# Patient Record
Sex: MALE | Race: WHITE | Employment: FULL TIME | URBAN - METROPOLITAN AREA
[De-identification: names, ages, dates, MRNs, and addresses within clinical notes are randomized per-mention and may not be internally consistent; named-entity substitution may affect disease eponyms.]

---

## 2020-11-30 ENCOUNTER — OFFICE VISIT (OUTPATIENT)
Dept: ENT CLINIC | Age: 50
End: 2020-11-30
Payer: COMMERCIAL

## 2020-11-30 VITALS — DIASTOLIC BLOOD PRESSURE: 88 MMHG | SYSTOLIC BLOOD PRESSURE: 142 MMHG | HEART RATE: 66 BPM | TEMPERATURE: 97.8 F

## 2020-11-30 PROCEDURE — 1036F TOBACCO NON-USER: CPT | Performed by: OTOLARYNGOLOGY

## 2020-11-30 PROCEDURE — G8427 DOCREV CUR MEDS BY ELIG CLIN: HCPCS | Performed by: OTOLARYNGOLOGY

## 2020-11-30 PROCEDURE — 3017F COLORECTAL CA SCREEN DOC REV: CPT | Performed by: OTOLARYNGOLOGY

## 2020-11-30 PROCEDURE — 99203 OFFICE O/P NEW LOW 30 MIN: CPT | Performed by: OTOLARYNGOLOGY

## 2020-11-30 PROCEDURE — G8484 FLU IMMUNIZE NO ADMIN: HCPCS | Performed by: OTOLARYNGOLOGY

## 2020-11-30 PROCEDURE — G8421 BMI NOT CALCULATED: HCPCS | Performed by: OTOLARYNGOLOGY

## 2020-11-30 RX ORDER — LISINOPRIL 10 MG/1
TABLET ORAL
COMMUNITY

## 2020-11-30 RX ORDER — FLUTICASONE PROPIONATE 50 MCG
SPRAY, SUSPENSION (ML) NASAL
COMMUNITY

## 2020-11-30 RX ORDER — PANTOPRAZOLE SODIUM 40 MG/1
TABLET, DELAYED RELEASE ORAL
COMMUNITY

## 2020-11-30 RX ORDER — MULTIVITAMIN
1 TABLET ORAL DAILY
COMMUNITY
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
## 2020-12-01 ENCOUNTER — PROCEDURE VISIT (OUTPATIENT)
Dept: AUDIOLOGY | Age: 50
End: 2020-12-01
Payer: COMMERCIAL

## 2020-12-01 PROCEDURE — 92557 COMPREHENSIVE HEARING TEST: CPT | Performed by: AUDIOLOGIST

## 2020-12-01 PROCEDURE — 92550 TYMPANOMETRY & REFLEX THRESH: CPT | Performed by: AUDIOLOGIST

## 2020-12-01 NOTE — PROGRESS NOTES
[facility]  Division of Audiology/Otolaryngology        PCP:  Renetta Esquivel DO  MRN: [id number]     CHIEF COMPLAINT:  Tinnitus        HISTORY OF PRESENT ILLNESS  Jose Leiva was seen for hearing and middle ear evaluation. Otolaryngology is referral source of today's appointment. Patient presents with tinnitus. There are minimal hearing concerns. AUDIOGRAM & IMMITTANCE    Hearing status is mild from both ears. The loss is of sensory nature. Loss is symmetrical. Good speech discernment demonstrated in quiet, at elevated levels. Immittance testing is consistent with normal middle ear status (Type A tympanogram). Some ipsilateral acoustic reflexes present. RECOMMENDATIONS / PLAN:    Follow medical recommendations. ENT to advise.

## 2021-07-22 ENCOUNTER — TELEPHONE (OUTPATIENT)
Dept: ENDOCRINOLOGY | Age: 51
End: 2021-07-22

## 2021-07-22 NOTE — TELEPHONE ENCOUNTER
Fax from [facility] w / a referral to endo  Diagnosis-decreased testosterone  Referring - Dr Kaela Flores

## 2021-12-01 ENCOUNTER — OFFICE VISIT (OUTPATIENT)
Dept: ENDOCRINOLOGY | Age: 51
End: 2021-12-01
Payer: COMMERCIAL

## 2021-12-01 VITALS — DIASTOLIC BLOOD PRESSURE: 87 MMHG | WEIGHT: 315 LBS | HEART RATE: 89 BPM | SYSTOLIC BLOOD PRESSURE: 141 MMHG

## 2021-12-01 DIAGNOSIS — R79.89 DECREASED TESTOSTERONE LEVEL: Primary | ICD-10-CM

## 2021-12-01 DIAGNOSIS — R73.03 PREDIABETES: ICD-10-CM

## 2021-12-01 DIAGNOSIS — E78.2 MIXED HYPERLIPIDEMIA: ICD-10-CM

## 2021-12-01 DIAGNOSIS — I10 ESSENTIAL HYPERTENSION: ICD-10-CM

## 2021-12-01 DIAGNOSIS — N52.8 OTHER MALE ERECTILE DYSFUNCTION: ICD-10-CM

## 2021-12-01 PROBLEM — K21.9 GASTROESOPHAGEAL REFLUX DISEASE WITHOUT ESOPHAGITIS: Status: ACTIVE | Noted: 2020-10-26

## 2021-12-01 PROCEDURE — G8484 FLU IMMUNIZE NO ADMIN: HCPCS | Performed by: INTERNAL MEDICINE

## 2021-12-01 PROCEDURE — G8421 BMI NOT CALCULATED: HCPCS | Performed by: INTERNAL MEDICINE

## 2021-12-01 PROCEDURE — G8427 DOCREV CUR MEDS BY ELIG CLIN: HCPCS | Performed by: INTERNAL MEDICINE

## 2021-12-01 PROCEDURE — 99203 OFFICE O/P NEW LOW 30 MIN: CPT | Performed by: INTERNAL MEDICINE

## 2021-12-01 RX ORDER — ALBUTEROL SULFATE 90 UG/1
AEROSOL, METERED RESPIRATORY (INHALATION)
COMMUNITY

## 2021-12-01 RX ORDER — SILDENAFIL 100 MG/1
TABLET, FILM COATED ORAL
COMMUNITY

## 2021-12-01 NOTE — PROGRESS NOTES
Patient  Is referred here for evaluation and management of low Testosterone levels. He has noted weight gain in the last 2 years also c/o erectile dysfunction and has tried Viagra with improved results. He was noted to have low testosterone on his annual screening according to the patient. He has noticed some decrease in physical strength. Retrospectively he thinks he has unexplained weight gain decrease in strength  He had no trauma to his head or head injury. He has no visual field defects. He has no anosmia. No history of pituitary disease. No breast discharge. No trauma to his testes. No history of mumps. No history of sleep apnea. He has no problems with fertility in the past and has children. He has no prominence of breast tissue. He has normal pubertal development. He has no history of microphallus. He has not noticed any changes in body hair distribution or decreased shaving frequency. He has noticed decrease in libido. He has erectile dysfunction with difficulty initiating and maintaining erection He has not noted muscular weakness in upper and lower extremities. He has no history of prostate problems. He has no problems with ejaculation. He has no history of any androgen use for performance enhancement or other purposes. On review of blood work from June 2021 patient had a PSA of 0.4, free testosterone of 32 which was slightly below the lower limit of normal of 35, sex hormone binding globulin was 27 which was within normal limits, he is noted to have an A1c of 6.0, TSH 0.9 hemoglobin hematocrit 14.6/44. total testosterone 137 ( 254 ) collected at 4 pm aprox.  He wakes up at 3 am.  Patient has history of essential hypertension for which she is on lisinopril  On review of records he appears to have prediabetes with an A1c of 6.0 in June 2021  He also has longstanding history of erectile dysfunction for which he has tried Viagra in the past.      Review of Systems:    I have reviewed the review of system questionnaire filled by the patient . Patient was advised to contact PCP for non endocrine signs and symptoms     History reviewed. No pertinent past medical history. History reviewed. No pertinent surgical history. Social History     Socioeconomic History    Marital status:      Spouse name: Not on file    Number of children: Not on file    Years of education: Not on file    Highest education level: Not on file   Occupational History    Not on file   Tobacco Use    Smoking status: Never Smoker    Smokeless tobacco: Never Used   Substance and Sexual Activity    Alcohol use: Yes    Drug use: Never    Sexual activity: Not on file   Other Topics Concern    Not on file   Social History Narrative    Not on file     Social Determinants of Health     Financial Resource Strain:     Difficulty of Paying Living Expenses: Not on file   Food Insecurity:     Worried About Running Out of Food in the Last Year: Not on file    Ruben of Food in the Last Year: Not on file   Transportation Needs:     Lack of Transportation (Medical): Not on file    Lack of Transportation (Non-Medical):  Not on file   Physical Activity:     Days of Exercise per Week: Not on file    Minutes of Exercise per Session: Not on file   Stress:     Feeling of Stress : Not on file   Social Connections:     Frequency of Communication with Friends and Family: Not on file    Frequency of Social Gatherings with Friends and Family: Not on file    Attends Yazidi Services: Not on file    Active Member of Clubs or Organizations: Not on file    Attends Club or Organization Meetings: Not on file    Marital Status: Not on file   Intimate Partner Violence:     Fear of Current or Ex-Partner: Not on file    Emotionally Abused: Not on file    Physically Abused: Not on file    Sexually Abused: Not on file   Housing Stability:     Unable to Pay for Housing in the Last Year: Not on file    Number of [place] in the Last Year: Not on file    Unstable Housing in the Last Year: Not on file     History reviewed. No pertinent family history. Prior to Admission medications    Medication Sig Start Date End Date Taking? Authorizing Provider   albuterol sulfate HFA (VENTOLIN HFA) 108 (90 Base) MCG/ACT inhaler Ventolin HFA 90 mcg/actuation aerosol inhaler   Inhale 2 puffs every 4 hours by inhalation route as needed. Yes Historical Provider, MD   sildenafil (VIAGRA) 100 MG tablet sildenafil 100 mg tablet   TAKE 1 TABLET BY MOUTH 1 HOUR PRIOR TO SEXUAL ACTIVITY, AS NEEDED UP TO ONCE DAILY   Yes Historical Provider, MD   lisinopril (PRINIVIL;ZESTRIL) 10 MG tablet lisinopril 10 mg tablet   Take 1 tablet every day by oral route. Yes Historical Provider, MD   pantoprazole (PROTONIX) 40 MG tablet pantoprazole 40 mg tablet,delayed release   Take 1 tablet every day by oral route. Yes Historical Provider, MD   Multiple Vitamin (MULTIVITAMIN) tablet Take 1 tablet by mouth daily   Yes Historical Provider, MD   fluticasone (FLONASE) 50 MCG/ACT nasal spray fluticasone propionate 50 mcg/actuation nasal spray,suspension   1 spray everyday to each nostril   Yes Historical Provider, MD     Allergies   Allergen Reactions    Latex Rash     Causes blisters      Seasonal         OBJECTIVE:  BP (!) 141/87   Pulse 89   Wt (!) 315 lb 9.6 oz (143.2 kg)      Constitutional: no acute distress, well appearing and well nourished  Psychiatric: oriented to person, place and time, judgement and insight and normal, recent and remote memory and intact and mood and affect are normal  Skin: skin and subcutaneous tissue is normal without mass, normal turgor  Head and Face: examination of head and face revealed no abnormalities  Eyes: no lid or conjunctival swelling, erythema or discharge, pupils are normal, equal and round .   Ears/Nose: external inspection of ears and nose revealed no abnormalities, hearing is grossly normal  Oropharynx/Mouth/Face: lips, tongue and gums are normal with no lesions, the voice quality was normal  Neck: neck is supple and symmetric, with midline trachea and no masses, thyroid is normal  Lymphatics: normal cervical lymph nodes, normal supraclavicular nodes  Pulmonary: no increased work of breathing or signs of respiratory distress, lungs are clear to auscultation  Cardiovascular: normal heart rate and rhythm, normal S1 and S2, no murmurs   Abdomen: abdomen is soft, non-tender with no masses  Musculoskeletal: normal gait and station . Neurological: normal coordination and normal general cortical function    Assessment/Plan:    1. Decreased testosterone level  Patient is noted to have a decreased testosterone level of 137 on his blood work in June 2021 which was drawn at approximately at 4 PM.   He had a PSA of 0.4 drawn at the same time. Hypogonadism appears to be secondary in etiology. He has obesity and metabolic syndrome and hypogonadism might be related to these. He probably has sleep apnea as well but has not been diagnosed with it he was advised to look into this diagnosis. He does not think he has that    At this time I will do hormonal pituitary evaluation as well as iron studies. I will complete the pituitary evaluation and if need be ,order an MRI of pituitary to rule out pituitary abnormality. Patient does not have any signs or symptoms suggestive of Klinefelter syndrome, if blood work dictates will further investigate this as an etiology. He was told that Testosterone replacement therapy can result in suppression of endogenous Testosterone production and decrease the chances of fertility. He was made aware of side effects of Testosterone therapy including gynecomastia, painful breasts, worsening prostate problems, increased hematocrit, and possible adverse effects on sleep apnea and lipid profile. Patient verbalized understanding and consented for treatment. - Estradiol; Future  - Follicle Stimulating Hormone;  Future  - Testosterone Free and Total Male; Future  - TSH with Reflex; Future  - FREE T4; Future  - Prolactin; Future  - Luteinizing Hormone; Future  - Comprehensive Metabolic Panel; Future    2. Other male erectile dysfunction  On Viagra     3. Essential hypertension  BP control adequate at home     4. Mixed hyperlipidemia  elevated levels   Follows with PCP     5. Prediabetes  Aic 6 in June 2021   Follows with PCP     Return in about 3 months (around 3/1/2022).

## 2021-12-01 NOTE — PATIENT INSTRUCTIONS
Patient Education        testosterone injection  Pronunciation:  vasiliy TOS ter one  Brand: Aveed, Depo-Testosterone, Testosterone Cypionate, Testosterone Enanthate, Xyosted  What is the most important information I should know about testosterone injection? You should not be treated with testosterone if you have prostate cancer, male breast cancer, a serious heart condition, severe liver or kidney disease, or an allergy to castor oil or sesame oil. This medicine is not for use in treating low testosterone without certain medical conditions or due to getting older. Testosterone should not be used to enhance athletic performance. Testosterone injection is not for use in women who are pregnant. Testosterone can increase your risk of heart attack, stroke, or death. You may need to stop using testosterone or start taking blood pressure medication. Misuse of testosterone can cause dangerous or irreversible effects. Do not share this medicine with another person. What is testosterone injection? Testosterone is a naturally occurring sex hormone produced in a man's testicles. Small amounts of testosterone are also produced in a woman's ovaries and adrenal system. Testosterone injection is used in men and boys to treat conditions caused by a lack of this hormone, such as delayed puberty, impotence, or other hormonal imbalances. Testosterone injection is not for use  in treating low testosterone without certain medical conditions or due to getting older. Testosterone enanthate is used in women to treat breast cancer that has spread to other parts of the body (metastatic) and cannot be treated with surgery. Testosterone will not enhance athletic performance and should not be used for that purpose. Testosterone injection may also be used for purposes not listed in this medication guide. What should I discuss with my healthcare provider before receiving testosterone injection?   You should not be treated with this medicine if you are allergic to testosterone, or if you have:  · male breast cancer;  · prostate cancer;  · serious heart problems;  · severe liver disease;  · severe kidney disease; or  · an allergy to castor oil or sesame oil. Testosterone injection is not for use in women who are pregnant. This medicine can harm an unborn baby. Tell your doctor if you have ever had:  · high blood pressure;  · heart problems, coronary artery disease (clogged arteries);  · a heart attack or stroke;  · sleep apnea;  · an enlarged prostate and urination problems;  · high cholesterol or triglycerides;  · cancer;  · depression, anxiety, a mood disorder, suicidal thoughts or actions;  · diabetes;  · high red blood cell (RBC) counts; or  · liver or kidney disease. Using testosterone may increase your risk of developing prostate cancer, liver problems, or heart problems (including heart attack, stroke, or death). Ask your doctor about these risks. Women using testosterone should not breastfeed. Testosterone should not be given to a child younger than 15years old. Some types of this medicine are not approved for use by anyone younger than 25years old. How is testosterone injection given? Testosterone is injected under the skin or into a muscle, usually given every 2 to 4 weeks. Testosterone injections should be given only by a healthcare professional.  The length of treatment with testosterone injection will depend on the condition being treated. Testosterone can raise your blood pressure, which could increase your risk of heart attack, stroke, or death. Your blood pressure will need to be checked often. You may need to stop using testosterone or start taking blood pressure medication. You will need frequent blood tests. Testosterone can affect bone growth in boys who are treated for delayed puberty. Bone development may need to be checked with x-rays every 6 months during treatment.   This medicine can affect the results of certain medical tests. Tell any doctor who treats you that you are using testosterone. Misuse of testosterone can cause dangerous or irreversible effects, such as enlarged breasts, small testicles, infertility, high blood pressure, heart attack, stroke, liver disease, bone growth problems, addiction, and mental effects such as aggression and violence. Stealing, selling, or giving away this medicine is against the law. If you have used too much testosterone, stopping the medicine may caused unpleasant withdrawal symptoms, such as depression, tiredness, irritability, loss of appetite, sleep problems, or decreased libido. What happens if I miss a dose? Call your doctor for instructions if you miss an appointment for your testosterone injection. What happens if I overdose? Since this medicine is given by a healthcare professional in a medical setting, an overdose is unlikely to occur. What should I avoid while receiving testosterone injection? Follow your doctor's instructions about any restrictions on food, beverages, or activity. What are the possible side effects of testosterone injection? Get emergency medical help if you have signs of an allergic reaction:  hives; difficult breathing; swelling of your face, lips, tongue, or throat. Tell your caregivers right away if you have a tight feeling in your throat, a sudden urge to cough, or if you feel light-headed or short of breath during or shortly after receiving the injection. You will be watched closely for at least 30 minutes to make sure you do not have a reaction to the injection.   Call your doctor at once if you have:  · chest pain or pressure, pain spreading to your jaw or shoulder;  · shortness of breath, breathing problems at night (sleep apnea);  · swelling in your ankles or feet, rapid weight gain;  · a seizure;  · unusual changes in mood or behavior;  · increased or ongoing erection of the penis, ejaculation problems, decreased amounts of semen, decrease in testicle size;  · painful or difficult urination, increased urination at night, loss of bladder control;  · high levels of calcium in the blood --stomach pain, constipation, increased thirst or urination, muscle pain or weakness, joint pain, confusion, and feeling tired or restless; or  · high potassium level --nausea, weakness, tingly feeling, chest pain, irregular heartbeats, loss of movement;  · liver problems --right-sided upper stomach pain, vomiting, loss of appetite, dark urine, jaundice (yellowing of the skin or eyes). · signs of a blood clot deep in the body --swelling, warmth, or redness in an arm or leg;  · signs of a blood clot in the lung --chest pain, sudden cough, wheezing, rapid breathing, coughing up blood; or  · signs of a stroke --sudden numbness or weakness (especially on one side of the body), severe headache, slurred speech, balance problems. Women receiving testosterone may develop male characteristics, which could be irreversible if treatment is continued. Call your doctor at once if you notice any of these signs of excess testosterone:  · acne;  · changes in your menstrual periods (including missed periods);  · male-pattern hair growth (such as on the chin or chest);  · hoarse or deepened voice; or  · enlarged clitoris. Your testosterone injections may be delayed or permanently discontinued if you have certain side effects. Common side effects (in men or women) may include:  · breast swelling;  · acne, increased facial or body hair growth, male-pattern baldness;  · increased or decreased interest in sex;  · headache, anxiety, depressed mood;  · increased blood pressure;  · numbness or tingly feeling;  · abnormal liver function tests;  · high red blood cell counts (hematocrit or hemoglobin);  · increased PSA (prostate-specific antigen); or  · pain, bruising, bleeding, redness, or a hard lump where the medicine was injected.   This is not a complete list of side effects and others may occur. Call your doctor for medical advice about side effects. You may report side effects to FDA at 4-369-AVN-3313. What other drugs will affect testosterone injection? Tell your doctor about all your other medicines, especially:  · insulin or oral diabetes medicine;  · medicine to treat pain, cough, or cold symptoms;  · a blood thinner --warfarin, Coumadin, Jantoven; or  · steroid medicine --prednisone, dexamethasone, and others. This list is not complete. Other drugs may affect testosterone, including prescription and over-the-counter medicines, vitamins, and herbal products. Not all possible drug interactions are listed here. Where can I get more information? Your doctor or pharmacist can provide more information about testosterone injection. Remember, keep this and all other medicines out of the reach of children, never share your medicines with others, and use this medication only for the indication prescribed. Every effort has been made to ensure that the information provided by [address] is accurate, up-to-date, and complete, but no guarantee is made to that effect. Drug information contained herein may be time sensitive. [employer] information has been compiled for use by healthcare practitioners and consumers in the [place] and therefore [employer] does not warrant that uses outside of the [place] are appropriate, unless specifically indicated otherwise. [employer]'s drug information does not endorse drugs, diagnose patients or recommend therapy. [employer] drug information is an informational resource designed to assist licensed healthcare practitioners in caring for their patients and/or to serve consumers viewing this service as a supplement to, and not a substitute for, the expertise, skill, knowledge and judgment of healthcare practitioners.  The absence of a warning for a given drug or drug combination in no way should be construed to indicate that the drug or drug combination is safe, effective or appropriate for any given patient. [facility] does not assume any responsibility for any aspect of healthcare administered with the aid of information [facility] provides. The information contained herein is not intended to cover all possible uses, directions, precautions, warnings, drug interactions, allergic reactions, or adverse effects. If you have questions about the drugs you are taking, check with your doctor, nurse or pharmacist.  Copyright 8294-3702 [address]: 4.01. Revision date: 7/14/2020. Care instructions adapted under license by [facility] ([facility]). If you have questions about a medical condition or this instruction, always ask your healthcare professional. [place] [address] any warranty or liability for your use of this information.

## 2021-12-01 NOTE — LETTER
[facility]  [address] [facility] [address] [address]  Phone: [phone]  Fax: [phone]    Helene James MD    December 1, 2021     [address] [address]  [address]    Patient: Karol Robledo   MR Number: [id number]   YOB: 1970   Date of Visit: 12/1/2021       Dear Edgar DOMINGO:    Thank you for referring Devin Lion to me for evaluation/treatment. Below are the relevant portions of my assessment and plan of care. If you have questions, please do not hesitate to call me. I look forward to following Cathy Ritter along with you.     Sincerely,      Helene James MD

## 2022-07-26 ENCOUNTER — HOSPITAL ENCOUNTER (OUTPATIENT)
Age: 52
Discharge: HOME | End: 2022-07-26
Payer: COMMERCIAL

## 2022-07-26 ENCOUNTER — HOSPITAL ENCOUNTER (OUTPATIENT)
Dept: GENERAL RADIOLOGY | Age: 52
Discharge: HOME | End: 2022-07-26
Payer: COMMERCIAL

## 2022-07-26 DIAGNOSIS — M25.561 RIGHT KNEE PAIN, UNSPECIFIED CHRONICITY: ICD-10-CM

## 2022-07-26 PROCEDURE — 73564 X-RAY EXAM KNEE 4 OR MORE: CPT

## 2024-05-02 ENCOUNTER — OFFICE VISIT (OUTPATIENT)
Dept: INTERNAL MEDICINE CLINIC | Age: 54
End: 2024-05-02
Payer: COMMERCIAL

## 2024-05-02 VITALS
WEIGHT: 315 LBS | HEIGHT: 74 IN | OXYGEN SATURATION: 95 % | HEART RATE: 89 BPM | BODY MASS INDEX: 40.43 KG/M2 | SYSTOLIC BLOOD PRESSURE: 132 MMHG | DIASTOLIC BLOOD PRESSURE: 76 MMHG

## 2024-05-02 DIAGNOSIS — I10 PRIMARY HYPERTENSION: ICD-10-CM

## 2024-05-02 DIAGNOSIS — J30.89 ENVIRONMENTAL AND SEASONAL ALLERGIES: ICD-10-CM

## 2024-05-02 DIAGNOSIS — Z87.828 HX OF TEAR OF MENISCUS OF KNEE JOINT: ICD-10-CM

## 2024-05-02 DIAGNOSIS — Z12.11 COLON CANCER SCREENING: ICD-10-CM

## 2024-05-02 DIAGNOSIS — R73.03 PREDIABETES: ICD-10-CM

## 2024-05-02 DIAGNOSIS — Z00.00 ANNUAL PHYSICAL EXAM: Primary | ICD-10-CM

## 2024-05-02 PROBLEM — L30.9 ECZEMA: Status: ACTIVE | Noted: 2023-09-11

## 2024-05-02 PROBLEM — K21.9 GASTROESOPHAGEAL REFLUX DISEASE WITHOUT ESOPHAGITIS: Status: RESOLVED | Noted: 2020-10-26 | Resolved: 2024-05-02

## 2024-05-02 PROBLEM — J45.990 EXERCISE-INDUCED ASTHMA: Status: ACTIVE | Noted: 2022-02-10

## 2024-05-02 PROBLEM — J45.990 EXERCISE-INDUCED ASTHMA: Status: RESOLVED | Noted: 2022-02-10 | Resolved: 2024-05-02

## 2024-05-02 PROCEDURE — 99386 PREV VISIT NEW AGE 40-64: CPT | Performed by: INTERNAL MEDICINE

## 2024-05-02 PROCEDURE — 3075F SYST BP GE 130 - 139MM HG: CPT | Performed by: INTERNAL MEDICINE

## 2024-05-02 PROCEDURE — 3078F DIAST BP <80 MM HG: CPT | Performed by: INTERNAL MEDICINE

## 2024-05-02 SDOH — ECONOMIC STABILITY: FOOD INSECURITY: WITHIN THE PAST 12 MONTHS, THE FOOD YOU BOUGHT JUST DIDN'T LAST AND YOU DIDN'T HAVE MONEY TO GET MORE.: NEVER TRUE

## 2024-05-02 SDOH — ECONOMIC STABILITY: FOOD INSECURITY: WITHIN THE PAST 12 MONTHS, YOU WORRIED THAT YOUR FOOD WOULD RUN OUT BEFORE YOU GOT MONEY TO BUY MORE.: NEVER TRUE

## 2024-05-02 SDOH — ECONOMIC STABILITY: HOUSING INSECURITY
IN THE LAST 12 MONTHS, WAS THERE A TIME WHEN YOU DID NOT HAVE A STEADY PLACE TO SLEEP OR SLEPT IN A SHELTER (INCLUDING NOW)?: NO

## 2024-05-02 SDOH — ECONOMIC STABILITY: INCOME INSECURITY: HOW HARD IS IT FOR YOU TO PAY FOR THE VERY BASICS LIKE FOOD, HOUSING, MEDICAL CARE, AND HEATING?: NOT HARD AT ALL

## 2024-05-02 ASSESSMENT — PATIENT HEALTH QUESTIONNAIRE - PHQ9
1. LITTLE INTEREST OR PLEASURE IN DOING THINGS: NOT AT ALL
SUM OF ALL RESPONSES TO PHQ QUESTIONS 1-9: 0
SUM OF ALL RESPONSES TO PHQ9 QUESTIONS 1 & 2: 0
SUM OF ALL RESPONSES TO PHQ QUESTIONS 1-9: 0
2. FEELING DOWN, DEPRESSED OR HOPELESS: NOT AT ALL

## 2024-05-02 ASSESSMENT — ENCOUNTER SYMPTOMS
CHEST TIGHTNESS: 0
COLOR CHANGE: 0
CONSTIPATION: 0
COUGH: 0
SORE THROAT: 0
ABDOMINAL PAIN: 0
WHEEZING: 0
SINUS PRESSURE: 0
NAUSEA: 0
RHINORRHEA: 0
VOMITING: 0
BACK PAIN: 0
SHORTNESS OF BREATH: 0

## 2024-05-02 NOTE — ASSESSMENT & PLAN NOTE
he does follow-up with [facility] orthopedic, he is status post meniscal repair surgery last year

## 2024-05-02 NOTE — PROGRESS NOTES
ASSESSMENT/PLAN:  1. Annual physical exam  Assessment & Plan:    age-related health maintenance and immunization recommendations reviewed and discussed with patient, he believes he is up-to-date with Tdap within the past 2 to 3 years, declined shingles vaccine  Labs ordered, healthy diet and regular exercise recommendations made to patient especially with history of metabolic syndrome including hypertension, prediabetes and hyperlipidemia.  Encouraged to continue abstinence from smoking and alcohol consumption  Recommendation for colon cancer screening discussed with patient, he had Cologuard done at age 49, encouraged to consider standard colonoscopy as it is the superior test however he prefers Cologuard.  He does understand pros and cons of using Cologuard  Denies any family or personal history of prostate cancer, denies any urinary symptoms, will check PSA baseline   Depression screen is negative  Follow-up in 6 to 12 months pending lab results  Orders:  -     CBC; Future  -     Comprehensive Metabolic Panel; Future  -     Hemoglobin A1C; Future  -     Lipid Panel; Future  -     TSH with Reflex to FT4; Future  -     PSA, Prostatic Specific Antigen; Future  -     Fecal DNA Colorectal cancer screening (Cologuard)  2. Primary hypertension  Assessment & Plan:    blood pressure stable and well-controlled on current dose of lisinopril, continue same  3. BMI 40.0-44.9, adult ([facility])  Assessment & Plan:    recommendations for weight loss made to patient, he does have limited ability to exercise or do cardio work out due to right knee flaring up following a meniscal tear 1 year ago.  Recommended adhering to low-carb diet, cut back on portion sizes and attempt nonweightbearing exercises.  4. Prediabetes  5. Colon cancer screening  -     Fecal DNA Colorectal cancer screening (Cologuard)  6. Hx of tear of meniscus of knee joint  Assessment & Plan:    he does follow-up with [facility], he is status post meniscal repair

## 2024-05-02 NOTE — ASSESSMENT & PLAN NOTE
not on any specific medications, uses over-the-counter antihistamine for sinus congestion during peak season but otherwise stable.  Encouraged to continue abstinence from smoking and avoid known triggers as possible

## 2024-05-02 NOTE — ASSESSMENT & PLAN NOTE
age-related health maintenance and immunization recommendations reviewed and discussed with patient, he believes he is up-to-date with Tdap within the past 2 to 3 years, declined shingles vaccine  Labs ordered, healthy diet and regular exercise recommendations made to patient especially with history of metabolic syndrome including hypertension, prediabetes and hyperlipidemia.  Encouraged to continue abstinence from smoking and alcohol consumption  Recommendation for colon cancer screening discussed with patient, he had Cologuard done at age 49, encouraged to consider standard colonoscopy as it is the superior test however he prefers Cologuard.  He does understand pros and cons of using Cologuard  Denies any family or personal history of prostate cancer, denies any urinary symptoms, will check PSA baseline   Depression screen is negative  Follow-up in 6 to 12 months pending lab results

## 2024-05-02 NOTE — ASSESSMENT & PLAN NOTE
recommendations for weight loss made to patient, he does have limited ability to exercise or do cardio work out due to right knee flaring up following a meniscal tear 1 year ago.  Recommended adhering to low-carb diet, cut back on portion sizes and attempt nonweightbearing exercises.

## 2024-05-20 LAB — NONINV COLON CA DNA+OCC BLD SCRN STL QL: NEGATIVE

## 2024-06-01 PROBLEM — Z00.00 ANNUAL PHYSICAL EXAM: Status: RESOLVED | Noted: 2024-05-02 | Resolved: 2024-06-01

## 2025-05-06 ENCOUNTER — OFFICE VISIT (OUTPATIENT)
Dept: INTERNAL MEDICINE CLINIC | Age: 55
End: 2025-05-06
Payer: COMMERCIAL

## 2025-05-06 VITALS
BODY MASS INDEX: 40.43 KG/M2 | HEIGHT: 74 IN | WEIGHT: 315 LBS | SYSTOLIC BLOOD PRESSURE: 155 MMHG | DIASTOLIC BLOOD PRESSURE: 90 MMHG | OXYGEN SATURATION: 94 % | HEART RATE: 76 BPM

## 2025-05-06 DIAGNOSIS — R60.0 BILATERAL EDEMA OF LOWER EXTREMITY: ICD-10-CM

## 2025-05-06 DIAGNOSIS — J30.89 ENVIRONMENTAL AND SEASONAL ALLERGIES: ICD-10-CM

## 2025-05-06 DIAGNOSIS — I10 PRIMARY HYPERTENSION: ICD-10-CM

## 2025-05-06 DIAGNOSIS — Z91.89 AT RISK FOR SLEEP APNEA: ICD-10-CM

## 2025-05-06 DIAGNOSIS — Z00.00 ANNUAL PHYSICAL EXAM: Primary | ICD-10-CM

## 2025-05-06 DIAGNOSIS — R73.03 PREDIABETES: ICD-10-CM

## 2025-05-06 PROCEDURE — 99214 OFFICE O/P EST MOD 30 MIN: CPT | Performed by: INTERNAL MEDICINE

## 2025-05-06 PROCEDURE — 3077F SYST BP >= 140 MM HG: CPT | Performed by: INTERNAL MEDICINE

## 2025-05-06 PROCEDURE — 3080F DIAST BP >= 90 MM HG: CPT | Performed by: INTERNAL MEDICINE

## 2025-05-06 PROCEDURE — G8427 DOCREV CUR MEDS BY ELIG CLIN: HCPCS | Performed by: INTERNAL MEDICINE

## 2025-05-06 PROCEDURE — 3017F COLORECTAL CA SCREEN DOC REV: CPT | Performed by: INTERNAL MEDICINE

## 2025-05-06 PROCEDURE — 1036F TOBACCO NON-USER: CPT | Performed by: INTERNAL MEDICINE

## 2025-05-06 PROCEDURE — G8417 CALC BMI ABV UP PARAM F/U: HCPCS | Performed by: INTERNAL MEDICINE

## 2025-05-06 PROCEDURE — 99396 PREV VISIT EST AGE 40-64: CPT | Performed by: INTERNAL MEDICINE

## 2025-05-06 RX ORDER — ALBUTEROL SULFATE 90 UG/1
2 INHALANT RESPIRATORY (INHALATION) 4 TIMES DAILY PRN
Qty: 18 G | Refills: 0 | Status: SHIPPED | OUTPATIENT
Start: 2025-05-06

## 2025-05-06 RX ORDER — NAPROXEN 500 MG/1
1 TABLET ORAL 2 TIMES DAILY
COMMUNITY

## 2025-05-06 RX ORDER — MONTELUKAST SODIUM 10 MG/1
10 TABLET ORAL DAILY
Qty: 90 TABLET | Refills: 0 | Status: SHIPPED | OUTPATIENT
Start: 2025-05-06

## 2025-05-06 RX ORDER — TADALAFIL 10 MG/1
10 TABLET ORAL PRN
COMMUNITY

## 2025-05-06 RX ORDER — MONTELUKAST SODIUM 10 MG/1
10 TABLET ORAL
COMMUNITY
End: 2025-05-06 | Stop reason: SDUPTHER

## 2025-05-06 RX ORDER — LISINOPRIL 10 MG/1
10 TABLET ORAL DAILY
Qty: 90 TABLET | Refills: 3 | Status: CANCELLED | OUTPATIENT
Start: 2025-05-06

## 2025-05-06 SDOH — ECONOMIC STABILITY: FOOD INSECURITY: WITHIN THE PAST 12 MONTHS, YOU WORRIED THAT YOUR FOOD WOULD RUN OUT BEFORE YOU GOT MONEY TO BUY MORE.: NEVER TRUE

## 2025-05-06 SDOH — ECONOMIC STABILITY: FOOD INSECURITY: WITHIN THE PAST 12 MONTHS, THE FOOD YOU BOUGHT JUST DIDN'T LAST AND YOU DIDN'T HAVE MONEY TO GET MORE.: NEVER TRUE

## 2025-05-06 ASSESSMENT — PATIENT HEALTH QUESTIONNAIRE - PHQ9
SUM OF ALL RESPONSES TO PHQ QUESTIONS 1-9: 0
SUM OF ALL RESPONSES TO PHQ QUESTIONS 1-9: 0
2. FEELING DOWN, DEPRESSED OR HOPELESS: NOT AT ALL
1. LITTLE INTEREST OR PLEASURE IN DOING THINGS: NOT AT ALL
SUM OF ALL RESPONSES TO PHQ QUESTIONS 1-9: 0
SUM OF ALL RESPONSES TO PHQ QUESTIONS 1-9: 0

## 2025-05-06 ASSESSMENT — ENCOUNTER SYMPTOMS
ABDOMINAL PAIN: 0
VOMITING: 0
BACK PAIN: 0
NAUSEA: 0
WHEEZING: 1
SINUS PRESSURE: 0
COLOR CHANGE: 0
COUGH: 1
CHEST TIGHTNESS: 0
SORE THROAT: 0
CONSTIPATION: 0
TROUBLE SWALLOWING: 0
SHORTNESS OF BREATH: 0

## 2025-05-06 NOTE — ASSESSMENT & PLAN NOTE
Blood pressure uncontrolled due to patient not taking meds and recent weight gain, diet and exercise recommendations reinforced, advised to complete lab work to ensure normal kidney function since it has been more than 2 years with him having any lab work, once results available we will go ahead and restart lisinopril and will reevaluate in 2 to 4 weeks

## 2025-05-06 NOTE — ASSESSMENT & PLAN NOTE
This is most likely secondary to weight gain and obesity, advised to maintain low-salt diet, active lifestyle and keep legs elevated when sitting

## 2025-05-06 NOTE — ASSESSMENT & PLAN NOTE
Continue daily cetirizine, advised to add nasal steroids as Flonase, will add Singulair and as needed albuterol, reassured no need for antibiotics as lungs auscultation is clear and the chronic dry cough is most likely flaring up of airway disease

## 2025-05-06 NOTE — ASSESSMENT & PLAN NOTE
Patient interested in starting GLP-1 agonist agent to help with weight loss, advised to get lab work completed and schedule follow-up visit to discuss options, Mounjaro was listed with his medication when outside medication reconciliation done but patient denying having that started or seeing any provider for it

## 2025-05-06 NOTE — ASSESSMENT & PLAN NOTE
Age-related health maintenance and immunization recommendations explained to patient, he declined all vaccines  Labs ordered, encouraged to get them completed as soon as possible to restart meds  BMI discussed with patient and encouraged maintaining healthy diet and active lifestyle  Patient did complete Cologuard last year  Denies any change in urinary symptoms from baseline, denies family or personal history of prostate cancer, PSA ordered  Depression screen is negative  Follow-up in 2 to 4 weeks

## 2025-05-06 NOTE — PROGRESS NOTES
ASSESSMENT/PLAN:  1. Annual physical exam  Assessment & Plan:  Age-related health maintenance and immunization recommendations explained to patient, he declined all vaccines  Labs ordered, encouraged to get them completed as soon as possible to restart meds  BMI discussed with patient and encouraged maintaining healthy diet and active lifestyle  Patient did complete Cologuard last year  Denies any change in urinary symptoms from baseline, denies family or personal history of prostate cancer, PSA ordered  Depression screen is negative  Follow-up in 2 to 4 weeks   Orders:  -     CBC; Future  -     Comprehensive Metabolic Panel; Future  -     TSH reflex to FT4; Future  -     Hemoglobin A1C; Future  -     Lipid Panel; Future  -     PSA, Prostatic Specific Antigen; Future  -     HIV Screen; Future  -     Hepatitis C Antibody; Future  2. Primary hypertension  Assessment & Plan:  Blood pressure uncontrolled due to patient not taking meds and recent weight gain, diet and exercise recommendations reinforced, advised to complete lab work to ensure normal kidney function since it has been more than 2 years with him having any lab work, once results available we will go ahead and restart lisinopril and will reevaluate in 2 to 4 weeks   3. BMI 40.0-44.9, adult (HCC)  Assessment & Plan:  Patient interested in starting GLP-1 agonist agent to help with weight loss, advised to get lab work completed and schedule follow-up visit to discuss options, Mounjaro was listed with his medication when outside medication reconciliation done but patient denying having that started or seeing any provider for it   4. Environmental and seasonal allergies  Assessment & Plan:  Continue daily cetirizine, advised to add nasal steroids as Flonase, will add Singulair and as needed albuterol, reassured no need for antibiotics as lungs auscultation is clear and the chronic dry cough is most likely flaring up of airway disease .  Continue to avoid smoke

## 2025-05-07 DIAGNOSIS — Z00.00 ANNUAL PHYSICAL EXAM: ICD-10-CM

## 2025-05-07 LAB
ALBUMIN SERPL-MCNC: 4.4 G/DL (ref 3.4–5)
ALBUMIN/GLOB SERPL: 1.7 {RATIO} (ref 1.1–2.2)
ALP SERPL-CCNC: 78 U/L (ref 40–129)
ALT SERPL-CCNC: 42 U/L (ref 10–40)
ANION GAP SERPL CALCULATED.3IONS-SCNC: 8 MMOL/L (ref 3–16)
AST SERPL-CCNC: 32 U/L (ref 15–37)
BILIRUB SERPL-MCNC: 0.4 MG/DL (ref 0–1)
BUN SERPL-MCNC: 13 MG/DL (ref 7–20)
CALCIUM SERPL-MCNC: 9.7 MG/DL (ref 8.3–10.6)
CHLORIDE SERPL-SCNC: 102 MMOL/L (ref 99–110)
CHOLEST SERPL-MCNC: 186 MG/DL (ref 0–199)
CO2 SERPL-SCNC: 29 MMOL/L (ref 21–32)
CREAT SERPL-MCNC: 0.8 MG/DL (ref 0.9–1.3)
DEPRECATED RDW RBC AUTO: 14.7 % (ref 12.4–15.4)
GFR SERPLBLD CREATININE-BSD FMLA CKD-EPI: >90 ML/MIN/{1.73_M2}
GLUCOSE SERPL-MCNC: 91 MG/DL (ref 70–99)
HCT VFR BLD AUTO: 49.1 % (ref 40.5–52.5)
HCV AB SERPL QL IA: NORMAL
HDLC SERPL-MCNC: 59 MG/DL (ref 40–60)
HGB BLD-MCNC: 16.4 G/DL (ref 13.5–17.5)
LDLC SERPL CALC-MCNC: 108 MG/DL
MCH RBC QN AUTO: 29.2 PG (ref 26–34)
MCHC RBC AUTO-ENTMCNC: 33.3 G/DL (ref 31–36)
MCV RBC AUTO: 87.5 FL (ref 80–100)
PLATELET # BLD AUTO: 215 K/UL (ref 135–450)
PMV BLD AUTO: 10.4 FL (ref 5–10.5)
POTASSIUM SERPL-SCNC: 4.6 MMOL/L (ref 3.5–5.1)
PROT SERPL-MCNC: 7 G/DL (ref 6.4–8.2)
PSA SERPL DL<=0.01 NG/ML-MCNC: 0.44 NG/ML (ref 0–4)
RBC # BLD AUTO: 5.61 M/UL (ref 4.2–5.9)
SODIUM SERPL-SCNC: 139 MMOL/L (ref 136–145)
TRIGL SERPL-MCNC: 97 MG/DL (ref 0–150)
TSH SERPL DL<=0.005 MIU/L-ACNC: 1.88 UIU/ML (ref 0.27–4.2)
VLDLC SERPL CALC-MCNC: 19 MG/DL
WBC # BLD AUTO: 8.5 K/UL (ref 4–11)

## 2025-05-08 ENCOUNTER — RESULTS FOLLOW-UP (OUTPATIENT)
Dept: INTERNAL MEDICINE CLINIC | Age: 55
End: 2025-05-08

## 2025-05-08 DIAGNOSIS — I10 PRIMARY HYPERTENSION: Primary | ICD-10-CM

## 2025-05-08 PROBLEM — Z00.00 ANNUAL PHYSICAL EXAM: Status: RESOLVED | Noted: 2024-05-02 | Resolved: 2025-05-08

## 2025-05-08 LAB
EST. AVERAGE GLUCOSE BLD GHB EST-MCNC: 131.2 MG/DL
HBA1C MFR BLD: 6.2 %
HIV 1+2 AB+HIV1 P24 AG SERPL QL IA: NORMAL
HIV 2 AB SERPL QL IA: NORMAL
HIV1 AB SERPL QL IA: NORMAL
HIV1 P24 AG SERPL QL IA: NORMAL

## 2025-05-08 RX ORDER — LISINOPRIL 10 MG/1
10 TABLET ORAL DAILY
Qty: 90 TABLET | Refills: 3 | Status: SHIPPED | OUTPATIENT
Start: 2025-05-08

## 2025-05-20 ENCOUNTER — OFFICE VISIT (OUTPATIENT)
Dept: INTERNAL MEDICINE CLINIC | Age: 55
End: 2025-05-20
Payer: COMMERCIAL

## 2025-05-20 VITALS
BODY MASS INDEX: 40.43 KG/M2 | HEART RATE: 72 BPM | OXYGEN SATURATION: 95 % | HEIGHT: 74 IN | SYSTOLIC BLOOD PRESSURE: 122 MMHG | DIASTOLIC BLOOD PRESSURE: 86 MMHG | WEIGHT: 315 LBS

## 2025-05-20 DIAGNOSIS — I10 PRIMARY HYPERTENSION: Primary | ICD-10-CM

## 2025-05-20 DIAGNOSIS — J30.89 ENVIRONMENTAL AND SEASONAL ALLERGIES: ICD-10-CM

## 2025-05-20 DIAGNOSIS — R73.03 PREDIABETES: ICD-10-CM

## 2025-05-20 DIAGNOSIS — J45.30 MILD PERSISTENT REACTIVE AIRWAY DISEASE WITHOUT COMPLICATION: ICD-10-CM

## 2025-05-20 PROBLEM — J45.909 REACTIVE AIRWAY DISEASE: Status: ACTIVE | Noted: 2025-05-20

## 2025-05-20 PROCEDURE — 1036F TOBACCO NON-USER: CPT | Performed by: INTERNAL MEDICINE

## 2025-05-20 PROCEDURE — G8427 DOCREV CUR MEDS BY ELIG CLIN: HCPCS | Performed by: INTERNAL MEDICINE

## 2025-05-20 PROCEDURE — 3074F SYST BP LT 130 MM HG: CPT | Performed by: INTERNAL MEDICINE

## 2025-05-20 PROCEDURE — 3079F DIAST BP 80-89 MM HG: CPT | Performed by: INTERNAL MEDICINE

## 2025-05-20 PROCEDURE — 3017F COLORECTAL CA SCREEN DOC REV: CPT | Performed by: INTERNAL MEDICINE

## 2025-05-20 PROCEDURE — G8417 CALC BMI ABV UP PARAM F/U: HCPCS | Performed by: INTERNAL MEDICINE

## 2025-05-20 PROCEDURE — 99214 OFFICE O/P EST MOD 30 MIN: CPT | Performed by: INTERNAL MEDICINE

## 2025-05-20 RX ORDER — FLUTICASONE PROPIONATE 110 UG/1
2 AEROSOL, METERED RESPIRATORY (INHALATION) 2 TIMES DAILY
Qty: 12 G | Refills: 1 | Status: SHIPPED | OUTPATIENT
Start: 2025-05-20 | End: 2026-05-20

## 2025-05-20 ASSESSMENT — ENCOUNTER SYMPTOMS
ABDOMINAL PAIN: 0
COUGH: 1
NAUSEA: 0
VOMITING: 0
COLOR CHANGE: 0
WHEEZING: 1
CONSTIPATION: 0
SORE THROAT: 0
BACK PAIN: 0
CHEST TIGHTNESS: 0
SHORTNESS OF BREATH: 0

## 2025-05-20 NOTE — ASSESSMENT & PLAN NOTE
Lab results reviewed and discussed with patient, counseling done regarding diet and lifestyle modification changes, will go ahead and order tirzepatide 2.5 mg, side effects discussed with patient, instructions on use made to patient, advised if well-tolerated by week 6 he will send [employer] message and will increase dose to 5 mg, will reevaluate in 3 months.  Patient verbalized understanding and will call the office if any concerns

## 2025-05-20 NOTE — ASSESSMENT & PLAN NOTE
Symptoms waxing and waning, he did discontinue cetirizine when Singulair was added, again explained to patient this was an add-on and he should continue both daily antihistamine, Flonase and Singulair, will also add maintenance inhaler since has been using albuterol on daily basis since his last visit here, continue to avoid smoke and known irritants

## 2025-05-20 NOTE — PROGRESS NOTES
deficit present.      Mental Status: He is alert.   Psychiatric:         Mood and Affect: Mood normal.           Electronically signed by Kevyn Louis MD on 5/20/2025 at 3:53 PM.    This dictation was generated by voice recognition computer software.  Although all attempts are made to edit the dictation for accuracy, there may be errors in the transcription that are not intended.

## 2025-05-20 NOTE — ASSESSMENT & PLAN NOTE
Labs reviewed and explained to patient, diet and lifestyle modification changes discussed with patient, will tirzepatide as adjunctive therapy to help with weight loss and stop progression of prediabetes

## 2025-05-21 ENCOUNTER — TELEPHONE (OUTPATIENT)
Dept: ADMINISTRATIVE | Age: 55
End: 2025-05-21

## 2025-05-21 DIAGNOSIS — J45.30 MILD PERSISTENT REACTIVE AIRWAY DISEASE WITHOUT COMPLICATION: Primary | ICD-10-CM

## 2025-05-21 NOTE — TELEPHONE ENCOUNTER
Submitted PA for Fluticasone Propionate HFA 110MCG/ACT aerosol   Via CM (Key: [id number])  STATUS: PENDING.    Follow up done daily; if no decision with in three days we will refax.  If another three days goes by with no decision will call the insurance for status.

## 2025-05-21 NOTE — TELEPHONE ENCOUNTER
Submitted PA for Mounjaro 2.5MG/0.5ML auto-injectors  Via CMM (Key: [id number]  STATUS: PENDING.    Follow up done daily; if no decision with in three days we will refax.  If another three days goes by with no decision will call the insurance for status.

## 2025-05-27 RX ORDER — FLUTICASONE FUROATE 100 UG/1
100 POWDER RESPIRATORY (INHALATION) DAILY
Qty: 30 EACH | Refills: 5 | Status: SHIPPED | OUTPATIENT
Start: 2025-05-27

## 2025-05-27 NOTE — TELEPHONE ENCOUNTER
The medication was DENIED; DENIAL letter will be  uploaded to MED[employer] once received    Generic Denial: Drug is not covered for off label usage.  This drug is FDA approved for :TYPE 2 DIABETES MELLITUS    Note :     Denied, the Medication was not approved by the payer. Please see faxed letter. Denial Date: 2025-05-23     If this requires a response please respond to the pool ( P [facility] MEDICATION PRE-AUTH).      Thank you please advise patient.

## 2025-05-27 NOTE — TELEPHONE ENCOUNTER
The medication was DENIED; DENIAL letter is uploaded to MED[employer].    Generic Denial: Patient must complete step therapy, Unless there is a contraindication that you can provide.     Note :        If you want an APPEAL; please note in this encounter what new information you would like to APPEAL with.  Once complete route back to PA POOL.    If this requires a response please respond to the pool ( P [facility] MEDICATION PRE-AUTH).      Thank you please advise patient.

## 2025-05-27 NOTE — TELEPHONE ENCOUNTER
Patient insurance prefers for him to try both Arnuity Ellipta and Qvar Redihaler before they can approve fluticasone (FLOVENT HFA) 110 MCG/ACT inhaler

## 2025-06-09 DIAGNOSIS — J30.89 ENVIRONMENTAL AND SEASONAL ALLERGIES: ICD-10-CM

## 2025-06-09 RX ORDER — ALBUTEROL SULFATE 90 UG/1
INHALANT RESPIRATORY (INHALATION)
Qty: 6.7 G | Refills: 0 | Status: SHIPPED | OUTPATIENT
Start: 2025-06-09